# Patient Record
Sex: FEMALE | Race: WHITE | NOT HISPANIC OR LATINO | Employment: OTHER | URBAN - NONMETROPOLITAN AREA
[De-identification: names, ages, dates, MRNs, and addresses within clinical notes are randomized per-mention and may not be internally consistent; named-entity substitution may affect disease eponyms.]

---

## 2022-10-01 ENCOUNTER — HISTORICAL (OUTPATIENT)
Dept: ADMINISTRATIVE | Facility: HOSPITAL | Age: 82
End: 2022-10-01

## 2023-10-07 ENCOUNTER — HOSPITAL ENCOUNTER (EMERGENCY)
Facility: HOSPITAL | Age: 83
Discharge: HOME | End: 2023-10-07
Attending: FAMILY MEDICINE
Payer: MEDICARE

## 2023-10-07 VITALS
HEART RATE: 60 BPM | OXYGEN SATURATION: 98 % | RESPIRATION RATE: 18 BRPM | DIASTOLIC BLOOD PRESSURE: 69 MMHG | WEIGHT: 177 LBS | TEMPERATURE: 98 F | HEIGHT: 63 IN | SYSTOLIC BLOOD PRESSURE: 148 MMHG | BODY MASS INDEX: 31.36 KG/M2
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
## 2023-10-07 PROCEDURE — 99284 EMERGENCY DEPT VISIT MOD MDM: CPT | Mod: 25

## 2023-10-07 NOTE — ED PROVIDER NOTES
Encounter Date: 10/7/2023       History     Chief Complaint   Patient presents with    Fall     AMB TO ED WITH C/O A TRIP AND FALL ABOUT 30 MINUTES AGO. SHE HIT HER HEAD ON THE CEMENT. NO LOC. LAC TO RIGHT BROW. PT ON [facility].      Patient says that she fell in the parking lot of the hotel she did not see the barricade in front of the car parking and she went face down she hit her head and she is on anticoagulants the patient is alert oriented she did not lose the consciousness she was able to talk and on exam her Cierra coma scale was fine she was able to interact and follow commands the Cierra coma scale was 15 x 15 she had mild abrasion on the right eyebrow        Review of patient's allergies indicates:  No Known Allergies  Past Medical History:   Diagnosis Date    A-fib     Hypertension      Past Surgical History:   Procedure Laterality Date    COLON SURGERY      HIP REPLACEMENT ARTHROPLASTY      HYSTERECTOMY       History reviewed. No pertinent family history.  Social History     Tobacco Use    Smoking status: Never    Smokeless tobacco: Never   Substance Use Topics    Alcohol use: Yes     Alcohol/week: 1.0 standard drink of alcohol     Types: 1 Glasses of wine per week    Drug use: Never     Review of Systems   Constitutional:  Negative for fever.   HENT:  Negative for sore throat.    Respiratory:  Negative for shortness of breath.    Cardiovascular:  Negative for chest pain.   Gastrointestinal:  Negative for nausea.   Genitourinary:  Negative for dysuria.   Musculoskeletal:  Negative for back pain.   Skin:  Negative for rash.        Abrasion on the right eyebrow   Neurological:  Negative for weakness.   Hematological:  Does not bruise/bleed easily.       Physical Exam     Initial Vitals [10/07/23 1200]   BP Pulse Resp Temp SpO2   (!) 149/79 60 20 98 °F (36.7 °C) 97 %      MAP       --         Physical Exam    Nursing note and vitals reviewed.  Constitutional: She appears well-developed.   HENT:   Head:  Normocephalic and atraumatic.   Eyes: Pupils are equal, round, and reactive to light.   Neck:   Normal range of motion.  Cardiovascular:  Normal rate, regular rhythm, normal heart sounds and intact distal pulses.           Pulmonary/Chest: Breath sounds normal.   Musculoskeletal:         General: Normal range of motion.      Cervical back: Normal range of motion.     Skin: Skin is warm.   Abrasion on the right eyebrow   Psychiatric: She has a normal mood and affect.         ED Course   Procedures  Labs Reviewed - No data to display       Imaging Results              CT Head Without Contrast (Final result)  Result time 10/07/23 12:47:13      Final result by Og Luu MD (10/07/23 12:47:13)                   Impression:      Mucosal thickening in the left maxillary sinus, no acute intracranial abnormalities are seen      Electronically signed by: Og Luu MD  Date:    10/07/2023  Time:    12:47               Narrative:    EXAMINATION:  CT HEAD WITHOUT CONTRAST    CLINICAL HISTORY:  Ataxia, head trauma;    TECHNIQUE:  Low dose axial images were obtained through the head.  Coronal and sagittal reformations were also performed. Contrast was not administered.    Automatic exposure control (AEC) was utilized for dose reduction.    Dose: 67607.5 mGycm    COMPARISON:  None.    FINDINGS:  There is mucosal thickening in the left maxillary sinus.  Ventricles of normal size and shape there is no shift of the midline noted.  There are no extra-axial fluid collections are areas consistent with hemorrhage noted.  No masses is seen no acute infarcts are noted.  The calvarium appears intact.                                       Medications - No data to display  Medical Decision Making  Patient is completely alert oriented capable of carrying out intelligent conversation did not lose consciousness after the fall we did the CT scan CT scan shows no acute abnormalities Cierra coma scale was 15 x 15 the patient will be  discharged she has a 0.5 cm abrasion which is not bleeding advised to put Neosporin cream expect spontaneous healing    Amount and/or Complexity of Data Reviewed  Radiology: ordered.                               Clinical Impression:   Final diagnoses:  [W19.XXXA] Fall, initial encounter (Primary)        ED Disposition Condition    Discharge Stable          ED Prescriptions    None       Follow-up Information    None          Srinivas Jimenes MD  10/07/23 [id number]